# Patient Record
Sex: FEMALE | Race: WHITE | NOT HISPANIC OR LATINO | URBAN - METROPOLITAN AREA
[De-identification: names, ages, dates, MRNs, and addresses within clinical notes are randomized per-mention and may not be internally consistent; named-entity substitution may affect disease eponyms.]

---

## 2017-08-28 ENCOUNTER — EMERGENCY (EMERGENCY)
Facility: HOSPITAL | Age: 47
LOS: 1 days | Discharge: HOME | End: 2017-08-28
Attending: EMERGENCY MEDICINE | Admitting: EMERGENCY MEDICINE
Payer: COMMERCIAL

## 2017-08-28 VITALS
OXYGEN SATURATION: 96 % | HEART RATE: 97 BPM | SYSTOLIC BLOOD PRESSURE: 142 MMHG | TEMPERATURE: 99 F | DIASTOLIC BLOOD PRESSURE: 99 MMHG | RESPIRATION RATE: 20 BRPM

## 2017-08-28 VITALS
OXYGEN SATURATION: 100 % | RESPIRATION RATE: 16 BRPM | HEART RATE: 77 BPM | DIASTOLIC BLOOD PRESSURE: 72 MMHG | SYSTOLIC BLOOD PRESSURE: 128 MMHG

## 2017-08-28 PROCEDURE — 99284 EMERGENCY DEPT VISIT MOD MDM: CPT

## 2017-08-28 PROCEDURE — 73110 X-RAY EXAM OF WRIST: CPT | Mod: 26,RT

## 2017-08-28 RX ORDER — IBUPROFEN 200 MG
600 TABLET ORAL ONCE
Qty: 0 | Refills: 0 | Status: COMPLETED | OUTPATIENT
Start: 2017-08-28 | End: 2017-08-28

## 2017-08-28 RX ORDER — IBUPROFEN 200 MG
1 TABLET ORAL
Qty: 20 | Refills: 0
Start: 2017-08-28

## 2017-08-28 RX ADMIN — Medication 50 MILLIGRAM(S): at 12:53

## 2017-08-28 RX ADMIN — Medication 600 MILLIGRAM(S): at 12:53

## 2017-08-28 NOTE — ED ADULT NURSE NOTE - OBJECTIVE STATEMENT
Pt received to room 10A with c/o of right hand pain and swelling radial pulse is present pt denies any trauma skin is dry intact.

## 2017-08-28 NOTE — ED ADULT TRIAGE NOTE - CHIEF COMPLAINT QUOTE
c/o right wrist pain pt treated in June with cortisone given a brace to wear pt woke up Monday with intense pain to wrist pt told by pmd to come to ER for evaluation pt told possible de quervain syndrome

## 2017-08-28 NOTE — ED PROVIDER NOTE - PROGRESS NOTE DETAILS
Pt states feeling better. Notes she works as a  with a lot of typing. Advised to continue wrist splint. Sent Motrin, Prednisone, and Percocet to pharmacy.

## 2017-08-28 NOTE — ED PROVIDER NOTE - MUSCULOSKELETAL MINIMAL EXAM
FROM, mild swelling over thenar eminence. No bony tenderness. Pain elicited with closed fist and lateral motion of wrist

## 2018-06-19 NOTE — ED PROVIDER NOTE - OBJECTIVE STATEMENT
48 y/o female with PMHx of tendonitis presents to ED for right hand pain X 2 months. Pt states 2 years ago she was seen by Hand Surgeon for wrist pain and found to have tendonitis and given a cortisone shot which helped relieve the pain for the past 2 years. Pt states over the past week pain has gotten worse with some swelling. Pt called her Hand Surgeon who is out of town and not available till next week. Pt denies any falls or trauma to the area. Admits to pain with fine motor movements. Denies any fever, chills, SOB, chest pain, or abd pain. Denies any skin color changes to the area. Pt was told she had deQuervain syndrome.
normal/no guarding/no distention/nontender/no organomegaly/no bruit/soft/no masses palpable/bowel sounds normal/no rebound tenderness/no rigidity

## 2019-11-18 PROBLEM — M77.9 ENTHESOPATHY, UNSPECIFIED: Chronic | Status: ACTIVE | Noted: 2017-08-28

## 2019-11-26 ENCOUNTER — APPOINTMENT (OUTPATIENT)
Dept: ORTHOPEDIC SURGERY | Facility: CLINIC | Age: 49
End: 2019-11-26
Payer: COMMERCIAL

## 2019-11-26 PROCEDURE — 73630 X-RAY EXAM OF FOOT: CPT | Mod: LT

## 2019-11-26 PROCEDURE — 99214 OFFICE O/P EST MOD 30 MIN: CPT

## 2020-03-23 ENCOUNTER — APPOINTMENT (OUTPATIENT)
Dept: ENDOCRINOLOGY | Facility: CLINIC | Age: 50
End: 2020-03-23
Payer: COMMERCIAL

## 2020-03-23 VITALS
HEART RATE: 86 BPM | WEIGHT: 293 LBS | OXYGEN SATURATION: 98 % | DIASTOLIC BLOOD PRESSURE: 80 MMHG | BODY MASS INDEX: 41.95 KG/M2 | SYSTOLIC BLOOD PRESSURE: 122 MMHG | HEIGHT: 70 IN

## 2020-03-23 PROCEDURE — 99244 OFF/OP CNSLTJ NEW/EST MOD 40: CPT | Mod: 25

## 2020-03-23 PROCEDURE — 76536 US EXAM OF HEAD AND NECK: CPT

## 2020-03-23 NOTE — REVIEW OF SYSTEMS
[Fatigue] : no fatigue [Decreased Appetite] : appetite not decreased [Recent Weight Gain (___ Lbs)] : no recent weight gain [Recent Weight Loss (___ Lbs)] : recent [unfilled] ~Ulb weight loss [Visual Field Defect] : no visual field defect [Blurry Vision] : no blurred vision [Dry Eyes] : no dryness of the eyes [Eyes Itch] : no itching of the eyes [Dysphagia] : no dysphagia [Dysphonia] : no dysphonia [Neck Pain] : no neck pain [Nasal Congestion] : no nasal congestion [Chest Pain] : no chest pain [Palpitations] : no palpitations [Heart Rate Is Slow] : the heart rate was not slow [Heart Rate Is Fast] : the heart rate was not fast [Shortness Of Breath] : no shortness of breath [Wheezing] : no wheezing was heard [Cough] : no cough [SOB on Exertion] : no shortness of breath during exertion [Nausea] : no nausea [Vomiting] : no vomiting was observed [Constipation] : no constipation [Diarrhea] : no diarrhea [Polyuria] : no polyuria [Irregular Menses] : regular menses [Joint Pain] : no joint pain [Joint Stiffness] : no joint stiffness [Muscle Weakness] : no muscle weakness [Muscle Cramps] : no muscle cramps [Acanthosis] : no acanthosis  [Hirsutism] : no hirsutism [Acne] : no acne [Hair Loss] : no hair loss [Headache] : no headaches [Tremors] : no tremors [Dizziness] : no dizziness [Depression] : no depression [Anxiety] : no anxiety [Polydipsia] : no polydipsia [Galactorrhea] : no galactorrhea  [Cold Intolerance] : cold tolerant [Heat Intolerance] : heat tolerant [Easy Bleeding] : no ~M tendency for easy bleeding [Easy Bruising] : no tendency for easy bruising [Swelling] : no swelling

## 2020-03-23 NOTE — HISTORY OF PRESENT ILLNESS
[FreeTextEntry1] : 49 year old female with history of obesity, thyroid nodule here for evaluation \par \par \par 2016- was the last ultrasound which showed a right sided 2.0 cm nodule \par 2002: Last FNA of the larger 2 cm nodule \par No family history of thyroid cancer \par No history of head or neck radiation exposure \par \par Never been on thyroid medications \par Difficulty swallowing, no hoarseness and no choking sensation \par No pressure in the neck \par \par \par She does not have recent TFTs \par She reported that she was on Genie Wilfrid and another one is HMR \par Previously had borderline elevated HgA1C

## 2020-03-23 NOTE — PROCEDURE
[DecisionView e 2008 model, 10-12 MHz frequencies] : multiple real time longitudinal and transverse images were obtained using a high resolution ultrasound with a linear transducer, DecisionView e 2008 model, 10-12 MHz frequencies. All measurements will be reported as longitudinal x alexx-posterior x transverse. [Thyroid Nodule] : thyroid nodule [] : a homogeneous parenchyma [Right Thyroid] : right [Lower] : lower pole there is a  [Solid] : solid [Heterogeneous] : heterogenous nodule [Ovoid] : ovoid in shape [Regular] : regular [No] : does not have a halo [No calcification] : no calcification [Left Thyroid] : left [Upper] : upper pole there is a  [Mixed] : mixed [Round] : round in shape [Smooth] : smooth [No] : does not have a halo [No calcifications] : no calcifications [Peripheral vascularity] : peripheral vascularity [2] : 2 [No abnormal lymph nodes are seen.] : no abnormal lymph nodes are seen [FreeTextEntry1] : 5.63 x 1.75 x 2.05 [FreeTextEntry5] : 4.1 x 1.96 x 1.75 [FreeTextEntry2] : 0.48 [FreeTextEntry3] : 0.53 x 0.5 x 0.47

## 2020-03-23 NOTE — IMPRESSION
[FreeTextEntry1] : Right lower pole nodule ( Solid, heterogenous), displaying interval stability  [FreeTextEntry2] : Follow up in one year with thyroid ultrasound

## 2020-03-23 NOTE — PHYSICAL EXAM
[Alert] : alert [No Acute Distress] : no acute distress [Well Nourished] : well nourished [Well Developed] : well developed [Normal Sclera/Conjunctiva] : normal sclera/conjunctiva [PERRL] : pupils equal, round and reactive to light [EOMI] : extra ocular movement intact [Normal Outer Ear/Nose] : the ears and nose were normal in appearance [Normal TMs] : both tympanic membranes were normal [Normal Hearing] : hearing was normal [No Neck Mass] : no neck mass was observed [Supple] : the neck was supple [Thyroid Not Enlarged] : the thyroid was not enlarged [No Respiratory Distress] : no respiratory distress [Normal Rate and Effort] : normal respiratory rhythm and effort [Clear to Auscultation] : lungs were clear to auscultation bilaterally [Normal Rate] : heart rate was normal  [Normal S1, S2] : normal S1 and S2 [Regular Rhythm] : with a regular rhythm [Normal Bowel Sounds] : normal bowel sounds [Not Tender] : non-tender [Soft] : abdomen soft [Not Distended] : not distended [No CVA Tenderness] : no ~M costovertebral angle tenderness [Kyphosis] : no kyphosis present [Scoliosis] : scoliosis not present [Normal Gait] : normal gait [No Joint Swelling] : no joint swelling seen [No Clubbing, Cyanosis] : no clubbing  or cyanosis of the fingernails [Normal Strength/Tone] : muscle strength and tone were normal [No Rash] : no rash [No Skin Lesions] : no skin lesions [Normal Reflexes] : deep tendon reflexes were 2+ and symmetric [No Motor Deficits] : the motor exam was normal [No Tremors] : no tremors [Oriented x3] : oriented to person, place, and time [Normal Insight/Judgement] : insight and judgment were intact [Normal Affect] : the affect was normal [Normal Mood] : the mood was normal

## 2020-03-23 NOTE — ASSESSMENT
[FreeTextEntry1] : 49 year old female here for evaluation of thyroid nodule, obesity and pre-diabetes here for evaluation\par \par Thyroid nodule\par ------------------\par -Previous FNA in 2002\par -Nodule displaying interval stability since 2016 \par -Will continue to observe once a year \par \par Pre- DM\par ---------\par -Will continue with healthy diet which is low carb consistent diet \par -Will incorporate exercise as well\par -Check HgA1C, lipid panel, microalb/cr ratio \par \par Obesity \par -Extensive discussion about bariatric surgery, she will be thinking about it \par \par Follow up in 6 months

## 2020-03-23 NOTE — PROCEDURE
[[employer] e 2008 model, 10-12 MHz frequencies] : multiple real time longitudinal and transverse images were obtained using a high resolution ultrasound with a linear transducer, [employer] e 2008 model, 10-12 MHz frequencies. All measurements will be reported as longitudinal x alexx-posterior x transverse. [Thyroid Nodule] : thyroid nodule [] : a homogeneous parenchyma [Right Thyroid] : right [Lower] : lower pole there is a  [Solid] : solid [Heterogeneous] : heterogenous nodule [Ovoid] : ovoid in shape [Regular] : regular [No] : does not have a halo [No calcification] : no calcification [Left Thyroid] : left [Upper] : upper pole there is a  [Mixed] : mixed [Round] : round in shape [Smooth] : smooth [No] : does not have a halo [No calcifications] : no calcifications [Peripheral vascularity] : peripheral vascularity [2] : 2 [No abnormal lymph nodes are seen.] : no abnormal lymph nodes are seen [FreeTextEntry1] : 5.63 x 1.75 x 2.05 [FreeTextEntry5] : 4.1 x 1.96 x 1.75 [FreeTextEntry2] : 0.48 [FreeTextEntry3] : 0.53 x 0.5 x 0.47

## 2020-03-25 LAB
25(OH)D3 SERPL-MCNC: 18.1 NG/ML
ALBUMIN SERPL ELPH-MCNC: 4.5 G/DL
ALP BLD-CCNC: 90 U/L
ALT SERPL-CCNC: 22 U/L
ANION GAP SERPL CALC-SCNC: 27 MMOL/L
AST SERPL-CCNC: 20 U/L
BASOPHILS # BLD AUTO: 0.09 K/UL
BASOPHILS NFR BLD AUTO: 0.8 %
BILIRUB SERPL-MCNC: 0.2 MG/DL
BUN SERPL-MCNC: 9 MG/DL
CALCIUM SERPL-MCNC: 9.4 MG/DL
CHLORIDE SERPL-SCNC: 101 MMOL/L
CHOLEST SERPL-MCNC: 173 MG/DL
CHOLEST/HDLC SERPL: 4.5 RATIO
CO2 SERPL-SCNC: 18 MMOL/L
CREAT SERPL-MCNC: 0.73 MG/DL
CREAT SPEC-SCNC: 77 MG/DL
EOSINOPHIL # BLD AUTO: 0.12 K/UL
EOSINOPHIL NFR BLD AUTO: 1.1 %
ESTIMATED AVERAGE GLUCOSE: 146 MG/DL
GLUCOSE SERPL-MCNC: 114 MG/DL
HBA1C MFR BLD HPLC: 6.7 %
HCT VFR BLD CALC: 49.1 %
HDLC SERPL-MCNC: 38 MG/DL
HGB BLD-MCNC: 15.6 G/DL
IMM GRANULOCYTES NFR BLD AUTO: 0.4 %
LDLC SERPL CALC-MCNC: 95 MG/DL
LYMPHOCYTES # BLD AUTO: 2.66 K/UL
LYMPHOCYTES NFR BLD AUTO: 23.9 %
MAN DIFF?: NORMAL
MCHC RBC-ENTMCNC: 28.4 PG
MCHC RBC-ENTMCNC: 31.8 GM/DL
MCV RBC AUTO: 89.4 FL
MICROALBUMIN 24H UR DL<=1MG/L-MCNC: 3.4 MG/DL
MICROALBUMIN/CREAT 24H UR-RTO: 44 MG/G
MONOCYTES # BLD AUTO: 0.7 K/UL
MONOCYTES NFR BLD AUTO: 6.3 %
NEUTROPHILS # BLD AUTO: 7.54 K/UL
NEUTROPHILS NFR BLD AUTO: 67.5 %
PLATELET # BLD AUTO: 345 K/UL
POTASSIUM SERPL-SCNC: 4.6 MMOL/L
PROT SERPL-MCNC: 7.8 G/DL
RBC # BLD: 5.49 M/UL
RBC # FLD: 14.1 %
SODIUM SERPL-SCNC: 145 MMOL/L
T4 FREE SERPL-MCNC: 1.1 NG/DL
TRIGL SERPL-MCNC: 199 MG/DL
TSH SERPL-ACNC: 4.77 UIU/ML
WBC # FLD AUTO: 11.15 K/UL

## 2020-04-20 ENCOUNTER — TRANSCRIPTION ENCOUNTER (OUTPATIENT)
Age: 50
End: 2020-04-20

## 2020-05-04 ENCOUNTER — APPOINTMENT (OUTPATIENT)
Dept: INTERNAL MEDICINE | Facility: CLINIC | Age: 50
End: 2020-05-04

## 2020-07-21 ENCOUNTER — APPOINTMENT (OUTPATIENT)
Dept: INTERNAL MEDICINE | Facility: CLINIC | Age: 50
End: 2020-07-21

## 2020-07-23 ENCOUNTER — APPOINTMENT (OUTPATIENT)
Dept: INTERNAL MEDICINE | Facility: CLINIC | Age: 50
End: 2020-07-23
Payer: COMMERCIAL

## 2020-07-23 ENCOUNTER — LABORATORY RESULT (OUTPATIENT)
Age: 50
End: 2020-07-23

## 2020-07-23 VITALS
DIASTOLIC BLOOD PRESSURE: 66 MMHG | WEIGHT: 293 LBS | HEART RATE: 78 BPM | OXYGEN SATURATION: 97 % | TEMPERATURE: 97.3 F | BODY MASS INDEX: 41.95 KG/M2 | SYSTOLIC BLOOD PRESSURE: 106 MMHG | HEIGHT: 70 IN

## 2020-07-23 DIAGNOSIS — Z00.00 ENCOUNTER FOR GENERAL ADULT MEDICAL EXAMINATION W/OUT ABNORMAL FINDINGS: ICD-10-CM

## 2020-07-23 DIAGNOSIS — M67.88 OTHER SPECIFIED DISORDERS OF SYNOVIUM AND TENDON, OTHER SITE: ICD-10-CM

## 2020-07-23 DIAGNOSIS — M77.9 ENTHESOPATHY, UNSPECIFIED: ICD-10-CM

## 2020-07-23 DIAGNOSIS — M21.6X9 OTHER ACQUIRED DEFORMITIES OF UNSPECIFIED FOOT: ICD-10-CM

## 2020-07-23 DIAGNOSIS — G62.9 POLYNEUROPATHY, UNSPECIFIED: ICD-10-CM

## 2020-07-23 DIAGNOSIS — M62.89 OTHER SPECIFIED DISORDERS OF MUSCLE: ICD-10-CM

## 2020-07-23 DIAGNOSIS — M92.61 JUVENILE OSTEOCHONDROSIS OF TARSUS, RIGHT ANKLE: ICD-10-CM

## 2020-07-23 DIAGNOSIS — Z87.898 PERSONAL HISTORY OF OTHER SPECIFIED CONDITIONS: ICD-10-CM

## 2020-07-23 DIAGNOSIS — R07.89 OTHER CHEST PAIN: ICD-10-CM

## 2020-07-23 LAB — HEMOCCULT STL QL IA: NEGATIVE

## 2020-07-23 PROCEDURE — G0447 BEHAVIOR COUNSEL OBESITY 15M: CPT

## 2020-07-23 PROCEDURE — 99215 OFFICE O/P EST HI 40 MIN: CPT | Mod: 25

## 2020-07-23 PROCEDURE — G0442 ANNUAL ALCOHOL SCREEN 15 MIN: CPT

## 2020-07-23 PROCEDURE — 99386 PREV VISIT NEW AGE 40-64: CPT

## 2020-07-23 NOTE — HISTORY OF PRESENT ILLNESS
[FreeTextEntry1] : Presents to re-establish care for preventive visit as well as concerns regarding anemia, morbid obesity, neuropathy, prediabetes, solitary thyroid nodule and vitamin D deficiency. [de-identified] : \par Preventive visit: pt is up to date with vaccines; she is due for colonoscopy screening and is due for mammogram screening; she does not smoke cigarettes and does not misuse alcohol; she feels safe at home and has smoke/CO detectors in the house; she wears seatbelts when in vehicles; she sees her GYN for PAP/pelvic exams\par \par Medical Issue 1: Anemia: unstable and variably controlled with fluctuations in Hb levels; this was due to blood loss from menorrhagia which has since improved due to removal of fibroids by her GYN; she denies pelvic pain, melena, BRBPR and coffee ground emesis\par \par Medical Issue 2: Morbid obesity: improving but BMI still above 50; she is working on her diet but not incorporating much exercise in her routine yet; she is asking for help with resources to achieve her weight loss goals\par \par Medical Issue 3: Neuropathy: unstable and worsening with increase in pain in her extremities; she has not followed up with neurology in the recent past but would like to do so now; she denies weakness in her extremities and denies paresthesias as well\par \par Medical Issue 4: Prediabetes: unstable with fluctuations in serum glucose levels; last fasting level 124 was closer to border with diabetes; this is complicated by concomitant neuropathy as well\par \par Medical Issue 5: Solitary thyroid nodule: stable, she denies change in size; she follows with Endocrine as well who has been monitoring it; had U/S recently which showed stability as per patient\par \par Medical Issue 6: Vitamin D deficiency: unstable and poorly controlled; she does not get much sun exposure and she is not consistent with her supplements; she denies bone pain and fractures

## 2020-07-23 NOTE — ASSESSMENT
[FreeTextEntry1] : \par Preventive visit: pt is up to date with vaccines; she is due for colonoscopy screening and is due for mammogram screening, referrals given today; she does not smoke cigarettes and does not misuse alcohol; she feels safe at home and has smoke/CO detectors in the house; she wears seatbelts when in vehicles; she sees her GYN for PAP/pelvic exams\[facility] \[facility] Medical Issue 1: Anemia: unstable and variably controlled with fluctuations in Hb levels; this was due to blood loss from menorrhagia which has since improved due to removal of fibroids by her GYN; she denies pelvic pain, melena, BRBPR and coffee ground emesis; check CBC, iron studies and B12 levels via blood draw today; coordinated follow-up care with GYN\[facility] \[facility] Medical Issue 2: Morbid obesity counselling: 15 mins, assessed BMI at 50 and above now in obese range; advised weight loss, exercise routine and diet; pt agreed to start exercise program for target weight loss 20 lbs; assisted patient with resources including nutrition referral as needed and arranged for follow-up to monitor weight loss progress over next several months\[facility] \[facility] Medical Issue 3: Neuropathy: unstable and worsening with increase in pain in her extremities; she has not followed up with neurology in the recent past but would like to do so now; she denies weakness in her extremities and denies paresthesias as well; coordinated follow-up care with neurology for EMG testing\[facility] \[facility] Medical Issue 4: Prediabetes: unstable with fluctuations in serum glucose levels; last fasting level 124 was closer to border with diabetes; this is complicated by concomitant neuropathy as well; check A1c and serum glucose via blood draw today; counselled pt on importance of eating low-carb diet\par \par Medical Issue 5: Solitary thyroid nodule: stable, she denies change in size; she follows with Endocrine as well who has been monitoring it; had U/S recently which showed stability as per patient; check TFTs via blood draw today and coordinated follow-up care with Endocrine\[facility] \[facility] Medical Issue 6: Vitamin D deficiency: unstable and poorly controlled; she does not get much sun exposure and she is not consistent with her supplements; she denies bone pain and fractures; check Vit D level via blood draw today\par \par Annual alcohol misuse screen, 15 mins, done; negative

## 2020-07-23 NOTE — HEALTH RISK ASSESSMENT
[Good] : ~his/her~  mood as  good [] : No [No] : No [1 or 2 (0 pts)] : 1 or 2 (0 points) [Never (0 pts)] : Never (0 points) [No falls in past year] : Patient reported no falls in the past year [Audit-CScore] : 0 [Change in mental status noted] : No change in mental status noted [Language] : denies difficulty with language [Behavior] : denies difficulty with behavior [Learning/Retaining New Information] : denies difficulty learning/retaining new information [Handling Complex Tasks] : denies difficulty handling complex tasks [Reasoning] : denies difficulty with reasoning [Spatial Ability and Orientation] : denies difficulty with spatial ability and orientation [None] : None [With Family] : lives with family [High Risk Behavior] : no high risk behavior [Feels Safe at Home] : Feels safe at home [Fully functional (bathing, dressing, toileting, transferring, walking, feeding)] : Fully functional (bathing, dressing, toileting, transferring, walking, feeding) [Fully functional (using the telephone, shopping, preparing meals, housekeeping, doing laundry, using] : Fully functional and needs no help or supervision to perform IADLs (using the telephone, shopping, preparing meals, housekeeping, doing laundry, using transportation, managing medications and managing finances) [Reports changes in hearing] : Reports no changes in hearing [Reports changes in vision] : Reports no changes in vision [Reports normal functional visual acuity (ie: able to read med bottle)] : Reports normal functional visual acuity [Reports changes in dental health] : Reports no changes in dental health [Smoke Detector] : smoke detector [Carbon Monoxide Detector] : carbon monoxide detector [Guns at Home] : no guns at home [Safety elements used in home] : safety elements used in home [Seat Belt] :  uses seat belt [Sunscreen] : uses sunscreen [Travel to Developing Areas] : does not  travel to developing areas [TB Exposure] : is not being exposed to tuberculosis [Caregiver Concerns] : does not have caregiver concerns [Reviewed no changes] : Reviewed no changes [AdvancecareDate] : 07/20

## 2020-07-24 LAB
25(OH)D3 SERPL-MCNC: 52.7 NG/ML
ALBUMIN SERPL ELPH-MCNC: 4.5 G/DL
ALP BLD-CCNC: 69 U/L
ALT SERPL-CCNC: 25 U/L
ANION GAP SERPL CALC-SCNC: 15 MMOL/L
AST SERPL-CCNC: 22 U/L
BASOPHILS # BLD AUTO: 0.11 K/UL
BASOPHILS NFR BLD AUTO: 1.3 %
BILIRUB SERPL-MCNC: 0.4 MG/DL
BUN SERPL-MCNC: 20 MG/DL
CALCIUM SERPL-MCNC: 9.4 MG/DL
CHLORIDE SERPL-SCNC: 102 MMOL/L
CHOLEST SERPL-MCNC: 152 MG/DL
CHOLEST/HDLC SERPL: 4 RATIO
CO2 SERPL-SCNC: 27 MMOL/L
CREAT SERPL-MCNC: 0.71 MG/DL
EOSINOPHIL # BLD AUTO: 0.14 K/UL
EOSINOPHIL NFR BLD AUTO: 1.6 %
ESTIMATED AVERAGE GLUCOSE: 123 MG/DL
FERRITIN SERPL-MCNC: 176 NG/ML
FOLATE SERPL-MCNC: 16.7 NG/ML
GLUCOSE SERPL-MCNC: 80 MG/DL
HBA1C MFR BLD HPLC: 5.9 %
HCT VFR BLD CALC: 47.6 %
HDLC SERPL-MCNC: 38 MG/DL
HGB BLD-MCNC: 14.7 G/DL
HIV1+2 AB SPEC QL IA.RAPID: NONREACTIVE
IMM GRANULOCYTES NFR BLD AUTO: 0.2 %
IRON SATN MFR SERPL: 27 %
IRON SERPL-MCNC: 78 UG/DL
LDLC SERPL CALC-MCNC: 95 MG/DL
LYMPHOCYTES # BLD AUTO: 2.53 K/UL
LYMPHOCYTES NFR BLD AUTO: 29.6 %
MAN DIFF?: NORMAL
MCHC RBC-ENTMCNC: 29.4 PG
MCHC RBC-ENTMCNC: 30.9 GM/DL
MCV RBC AUTO: 95.2 FL
MONOCYTES # BLD AUTO: 0.46 K/UL
MONOCYTES NFR BLD AUTO: 5.4 %
NEUTROPHILS # BLD AUTO: 5.28 K/UL
NEUTROPHILS NFR BLD AUTO: 61.9 %
PLATELET # BLD AUTO: 273 K/UL
POTASSIUM SERPL-SCNC: 4.9 MMOL/L
PROT SERPL-MCNC: 7.3 G/DL
RBC # BLD: 5 M/UL
RBC # FLD: 14.4 %
SARS-COV-2 IGG SERPL IA-ACNC: 0.08 INDEX
SARS-COV-2 IGG SERPL QL IA: NEGATIVE
SODIUM SERPL-SCNC: 144 MMOL/L
TIBC SERPL-MCNC: 286 UG/DL
TRIGL SERPL-MCNC: 95 MG/DL
TSH SERPL-ACNC: 4.4 UIU/ML
UIBC SERPL-MCNC: 208 UG/DL
VIT B12 SERPL-MCNC: 602 PG/ML
WBC # FLD AUTO: 8.54 K/UL

## 2020-07-30 LAB
APPEARANCE: CLEAR
BACTERIA: ABNORMAL
BILIRUBIN URINE: NEGATIVE
BLOOD URINE: ABNORMAL
COLOR: NORMAL
GLUCOSE QUALITATIVE U: NEGATIVE
HYALINE CASTS: 2 /LPF
KETONES URINE: NEGATIVE
LEUKOCYTE ESTERASE URINE: NEGATIVE
MICROSCOPIC-UA: NORMAL
NITRITE URINE: NEGATIVE
PH URINE: 6.5
PROTEIN URINE: NEGATIVE
RED BLOOD CELLS URINE: 5 /HPF
SPECIFIC GRAVITY URINE: 1.02
SQUAMOUS EPITHELIAL CELLS: 4 /HPF
UROBILINOGEN URINE: NORMAL
WHITE BLOOD CELLS URINE: 4 /HPF

## 2020-09-11 ENCOUNTER — APPOINTMENT (OUTPATIENT)
Dept: GASTROENTEROLOGY | Facility: CLINIC | Age: 50
End: 2020-09-11
Payer: COMMERCIAL

## 2020-09-11 VITALS
TEMPERATURE: 98.1 F | OXYGEN SATURATION: 96 % | SYSTOLIC BLOOD PRESSURE: 103 MMHG | DIASTOLIC BLOOD PRESSURE: 70 MMHG | HEIGHT: 70 IN | HEART RATE: 66 BPM

## 2020-09-11 DIAGNOSIS — K64.9 UNSPECIFIED HEMORRHOIDS: ICD-10-CM

## 2020-09-11 PROCEDURE — 99244 OFF/OP CNSLTJ NEW/EST MOD 40: CPT

## 2020-09-11 RX ORDER — FLUTICASONE PROPIONATE 50 MCG
SPRAY, SUSPENSION NASAL
Refills: 0 | Status: ACTIVE | COMMUNITY

## 2020-09-11 NOTE — PHYSICAL EXAM
[General Appearance - Alert] : alert [General Appearance - In No Acute Distress] : in no acute distress [Neck Appearance] : the appearance of the neck was normal [Neck Cervical Mass (___cm)] : no neck mass was observed [Jugular Venous Distention Increased] : there was no jugular-venous distention [Thyroid Diffuse Enlargement] : the thyroid was not enlarged [Thyroid Nodule] : there were no palpable thyroid nodules [] : no respiratory distress [Auscultation Breath Sounds / Voice Sounds] : lungs were clear to auscultation bilaterally [Heart Sounds] : normal S1 and S2 [Heart Rate And Rhythm] : heart rate was normal and rhythm regular [Obese] : obese [Normal] : normal [Soft, Nontender] : the abdomen was soft and nontender [Cervical Lymph Nodes Enlarged Posterior Bilaterally] : posterior cervical [Supraclavicular Lymph Nodes Enlarged Bilaterally] : supraclavicular [Cervical Lymph Nodes Enlarged Anterior Bilaterally] : anterior cervical [Axillary Lymph Nodes Enlarged Bilaterally] : axillary [No Spinal Tenderness] : no spinal tenderness [No CVA Tenderness] : no ~M costovertebral angle tenderness [Abnormal Walk] : normal gait [Nail Clubbing] : no clubbing  or cyanosis of the fingernails [Motor Tone] : muscle strength and tone were normal [Musculoskeletal - Swelling] : no joint swelling seen [Sensation] : the sensory exam was normal to light touch and pinprick [Deep Tendon Reflexes (DTR)] : deep tendon reflexes were 2+ and symmetric [Oriented To Time, Place, And Person] : oriented to person, place, and time [Impaired Insight] : insight and judgment were intact [No Focal Deficits] : no focal deficits [Affect] : the affect was normal [Dilated Collat. Veins] : no collateral vein dilation [Firm] : not firm [Rigid] : not rigid [Guarding] : no guarding [Rebound] : no rebound [Aragon's] : a negative Aragon's sign

## 2020-09-11 NOTE — REVIEW OF SYSTEMS
[As Noted in HPI] : as noted in HPI [Negative] : Heme/Lymph [Abdominal Pain] : no abdominal pain [Constipation] : no constipation [Diarrhea] : no diarrhea [Heartburn] : no heartburn [Melena] : no melena

## 2020-09-11 NOTE — HISTORY OF PRESENT ILLNESS
[Nausea] : denies nausea [Vomiting] : denies vomiting [Heartburn] : denies heartburn [Diarrhea] : denies diarrhea [Constipation] : denies constipation [Yellow Skin Or Eyes (Jaundice)] : denies jaundice [Abdominal Swelling] : denies abdominal swelling [Abdominal Pain] : denies abdominal pain [Rectal Pain] : denies rectal pain [GERD] : no gastroesophageal reflux disease [Hiatus Hernia] : no hiatus hernia [Peptic Ulcer Disease] : no peptic ulcer disease [Pancreatitis] : no pancreatitis [Cholelithiasis] : no cholelithiasis [Kidney Stone] : no kidney stone [Inflammatory Bowel Disease] : no inflammatory bowel disease [Irritable Bowel Syndrome] : no irritable bowel syndrome [Diverticulitis] : no diverticulitis [Alcohol Abuse] : no alcohol abuse [Malignancy] : no malignancy [Appendectomy] : no appendectomy [Abdominal Surgery] : no abdominal surgery [de-identified] : 50 year old female requesting colon cancer screening needs colonoscopy. Complains of intermittent bleeding from rectal hemorrhoids. Denies any blood in stool, melena or hematemesis. Blood only noticeable upon completion of defecation and wiping with toilet tissue. Patient also expressed extreme desire to lose weight. Requests information on how to lose wieght with proper eating.  [Cholecystectomy] : no cholecystectomy

## 2020-09-22 ENCOUNTER — APPOINTMENT (OUTPATIENT)
Dept: PULMONOLOGY | Facility: CLINIC | Age: 50
End: 2020-09-22
Payer: COMMERCIAL

## 2020-09-22 ENCOUNTER — LABORATORY RESULT (OUTPATIENT)
Age: 50
End: 2020-09-22

## 2020-09-22 VITALS
OXYGEN SATURATION: 96 % | SYSTOLIC BLOOD PRESSURE: 120 MMHG | TEMPERATURE: 98.4 F | DIASTOLIC BLOOD PRESSURE: 80 MMHG | RESPIRATION RATE: 16 BRPM | HEART RATE: 81 BPM

## 2020-09-22 DIAGNOSIS — T78.40XA ALLERGY, UNSPECIFIED, INITIAL ENCOUNTER: ICD-10-CM

## 2020-09-22 PROCEDURE — 99203 OFFICE O/P NEW LOW 30 MIN: CPT

## 2020-09-23 ENCOUNTER — APPOINTMENT (OUTPATIENT)
Dept: ENDOCRINOLOGY | Facility: CLINIC | Age: 50
End: 2020-09-23

## 2020-10-01 ENCOUNTER — APPOINTMENT (OUTPATIENT)
Dept: PULMONOLOGY | Facility: CLINIC | Age: 50
End: 2020-10-01
Payer: COMMERCIAL

## 2020-10-01 PROCEDURE — 94060 EVALUATION OF WHEEZING: CPT

## 2020-10-01 PROCEDURE — 94729 DIFFUSING CAPACITY: CPT

## 2020-10-01 PROCEDURE — 94727 GAS DIL/WSHOT DETER LNG VOL: CPT

## 2020-10-05 LAB
A ALTERNATA IGE QN: <0.1 KUA/L
A FUMIGATUS IGE QN: <0.1 KUA/L
BASOPHILS # BLD AUTO: 0.08 K/UL
BASOPHILS NFR BLD AUTO: 0.9 %
BERMUDA GRASS IGE QN: <0.1 KUA/L
BOXELDER IGE QN: <0.1 KUA/L
C HERBARUM IGE QN: <0.1 KUA/L
CALIF WALNUT IGE QN: <0.1 KUA/L
CAT DANDER IGE QN: <0.1 KUA/L
CMN PIGWEED IGE QN: <0.1 KUA/L
COMMON RAGWEED IGE QN: <0.1 KUA/L
COTTONWOOD IGE QN: <0.1 KUA/L
D FARINAE IGE QN: <0.1 KUA/L
D PTERONYSS IGE QN: <0.1 KUA/L
DEPRECATED A ALTERNATA IGE RAST QL: 0
DEPRECATED A FUMIGATUS IGE RAST QL: 0
DEPRECATED BERMUDA GRASS IGE RAST QL: 0
DEPRECATED BOXELDER IGE RAST QL: 0
DEPRECATED C HERBARUM IGE RAST QL: 0
DEPRECATED CAT DANDER IGE RAST QL: 0
DEPRECATED COMMON PIGWEED IGE RAST QL: 0
DEPRECATED COMMON RAGWEED IGE RAST QL: 0
DEPRECATED COTTONWOOD IGE RAST QL: 0
DEPRECATED D FARINAE IGE RAST QL: 0
DEPRECATED D PTERONYSS IGE RAST QL: 0
DEPRECATED DOG DANDER IGE RAST QL: 0
DEPRECATED GOOSEFOOT IGE RAST QL: 0
DEPRECATED LONDON PLANE IGE RAST QL: 0
DEPRECATED MUGWORT IGE RAST QL: 0
DEPRECATED P NOTATUM IGE RAST QL: 0
DEPRECATED RED CEDAR IGE RAST QL: 0
DEPRECATED ROACH IGE RAST QL: 0
DEPRECATED SHEEP SORREL IGE RAST QL: 0
DEPRECATED SILVER BIRCH IGE RAST QL: 0
DEPRECATED TIMOTHY IGE RAST QL: 0
DEPRECATED WHITE ASH IGE RAST QL: 0
DEPRECATED WHITE OAK IGE RAST QL: 0
DOG DANDER IGE QN: <0.1 KUA/L
EOSINOPHIL # BLD AUTO: 0.17 K/UL
EOSINOPHIL NFR BLD AUTO: 1.9 %
GOOSEFOOT IGE QN: <0.1 KUA/L
HCT VFR BLD CALC: 47.3 %
HGB BLD-MCNC: 14.7 G/DL
IMM GRANULOCYTES NFR BLD AUTO: 0.3 %
LONDON PLANE IGE QN: <0.1 KUA/L
LYMPHOCYTES # BLD AUTO: 2.72 K/UL
LYMPHOCYTES NFR BLD AUTO: 30.5 %
MAN DIFF?: NORMAL
MCHC RBC-ENTMCNC: 29.2 PG
MCHC RBC-ENTMCNC: 31.1 GM/DL
MCV RBC AUTO: 93.8 FL
MONOCYTES # BLD AUTO: 0.48 K/UL
MONOCYTES NFR BLD AUTO: 5.4 %
MUGWORT IGE QN: <0.1 KUA/L
MULBERRY (T70) CLASS: 0
MULBERRY (T70) CONC: <0.1 KUA/L
NEUTROPHILS # BLD AUTO: 5.43 K/UL
NEUTROPHILS NFR BLD AUTO: 61 %
P NOTATUM IGE QN: <0.1 KUA/L
PLATELET # BLD AUTO: 339 K/UL
RBC # BLD: 5.04 M/UL
RBC # FLD: 14 %
RED CEDAR IGE QN: <0.1 KUA/L
ROACH IGE QN: <0.1 KUA/L
SARS-COV-2 N GENE NPH QL NAA+PROBE: NOT DETECTED
SHEEP SORREL IGE QN: <0.1 KUA/L
SILVER BIRCH IGE QN: <0.1 KUA/L
TIMOTHY IGE QN: <0.1 KUA/L
TOTAL IGE SMQN RAST: 28 KU/L
TREE ALLERG MIX1 IGE QL: 0
WBC # FLD AUTO: 8.91 K/UL
WHITE ASH IGE QN: <0.1 KUA/L
WHITE ELM IGE QN: 0
WHITE ELM IGE QN: <0.1 KUA/L
WHITE OAK IGE QN: <0.1 KUA/L

## 2020-10-07 DIAGNOSIS — R05 COUGH: ICD-10-CM

## 2020-10-07 DIAGNOSIS — G47.9 SLEEP DISORDER, UNSPECIFIED: ICD-10-CM

## 2020-10-07 NOTE — REVIEW OF SYSTEMS
[Nasal Congestion] : nasal congestion [Cough] : cough [Hay Fever] : hay fever [Fever] : no fever [Dry Eyes] : no dry eyes [Dyspnea] : no dyspnea [TextBox_144] : prediabetes, see HPI

## 2020-10-07 NOTE — PHYSICAL EXAM
[No Acute Distress] : no acute distress [II] : Mallampati Class: II [No Neck Mass] : no neck mass [No JVD] : no jvd [Normal Rate/Rhythm] : normal rate/rhythm [Normal S1, S2] : normal s1, s2 [No Murmurs] : no murmurs [No Resp Distress] : no resp distress [Clear to Auscultation Bilaterally] : clear to auscultation bilaterally [Benign] : benign [No Masses] : no masses [No HSM] : no hsm [Normal Bowel Sounds] : normal bowel sounds [No Clubbing] : no clubbing [No Edema] : no edema [No Focal Deficits] : no focal deficits [Oriented x3] : oriented x3 [TextBox_11] : crowded [TextBox_44] : thick, kyphotic

## 2020-10-07 NOTE — DISCUSSION/SUMMARY
[FreeTextEntry1] : 50 year old woman presents for evaluation because she has had WTC exposure.  she has allergy symptoms and has been advised to have pulmonary evaluation\par \par She is at risk for sleep apnea\par \par PLAN\par blood allergy testing testing\par \par return for PFT after COVID testing\par \par will consider sleep study\par \par She will will consider PPSV\par \par Jose Pineda MD [facility] \par \par ADDENDUM\par \par PFT performed 10/1/2020\par \par PFT\par \par normal spirometry, lung volumes and diffusion\par \par \par  Jose Pineda MD [facility]

## 2020-10-07 NOTE — HISTORY OF PRESENT ILLNESS
[TextBox_4] : 50 year old woman who  presents for evaluation.  She states that she was exposed at [facility] site in 2002 when she worked in area. \par \par \par She states she subsequently developed wheezing, cough, watery eyes.  She also reports frequent outer ear infections.  She states she has not been diagnosed with asthma though she has uses inhaled bronchodilator in past,  rarely.  CXR 7/8/2020 was read as normal. She states she currently does not have dyspnea. \par \par She underwent allergy testing in 2002 and 2008 that demonstrated allergy to pollen, cats and dust as well as others but she does not have results.  She uses Flonase daily.  \par \par PSH:\par tonsillectomy\par hand surgery\par \par \par PMH:\par prediabetes\par prior sinus infection treated with antibiotics, almost annually\par ear infections, outer ear, follows with ENT\par skin cancer on back, basal cell, being treated\par \par MEDS:\par Flonase\par vit D\par \par MVI\par \par \par ROS\par \par lost weight with diet\par \par no history of asthma, pneumonia, CAD, HLD, GI issues\par \par SH:\par \par former smoker quit 2005\par \par started smoking a s teen\par \par smoked few cigarettes per day, 2 packs per week.\par \par works in office\par \par ETOH\par \par none\par \par has a cat\par gabapentin\par \par \par SLEEP\par No snoring, witnessed apnea, excessive daytime sleepiness, morning headache \par \par She receives influenza vaccine annually\par \par She has not had had pneumonia vaccine, declines

## 2020-10-10 ENCOUNTER — APPOINTMENT (OUTPATIENT)
Dept: DISASTER EMERGENCY | Facility: CLINIC | Age: 50
End: 2020-10-10

## 2020-12-05 ENCOUNTER — APPOINTMENT (OUTPATIENT)
Dept: DISASTER EMERGENCY | Facility: CLINIC | Age: 50
End: 2020-12-05

## 2020-12-06 LAB — SARS-COV-2 N GENE NPH QL NAA+PROBE: NOT DETECTED

## 2020-12-08 ENCOUNTER — OUTPATIENT (OUTPATIENT)
Dept: OUTPATIENT SERVICES | Facility: HOSPITAL | Age: 50
LOS: 1 days | Discharge: HOME | End: 2020-12-08

## 2020-12-08 ENCOUNTER — APPOINTMENT (OUTPATIENT)
Dept: GASTROENTEROLOGY | Facility: HOSPITAL | Age: 50
End: 2020-12-08
Payer: COMMERCIAL

## 2020-12-08 VITALS
SYSTOLIC BLOOD PRESSURE: 114 MMHG | OXYGEN SATURATION: 99 % | HEART RATE: 78 BPM | DIASTOLIC BLOOD PRESSURE: 62 MMHG | RESPIRATION RATE: 17 BRPM

## 2020-12-08 VITALS
HEIGHT: 70 IN | SYSTOLIC BLOOD PRESSURE: 117 MMHG | DIASTOLIC BLOOD PRESSURE: 76 MMHG | RESPIRATION RATE: 20 BRPM | TEMPERATURE: 98 F | WEIGHT: 293 LBS | HEART RATE: 86 BPM

## 2020-12-08 LAB — HCG UR QL: NEGATIVE — SIGNIFICANT CHANGE UP

## 2020-12-08 PROCEDURE — ZZZZZ: CPT

## 2020-12-08 PROCEDURE — 46930 DESTROY INTERNAL HEMORRHOIDS: CPT

## 2020-12-08 PROCEDURE — 45378 DIAGNOSTIC COLONOSCOPY: CPT | Mod: 33

## 2020-12-08 RX ORDER — SODIUM CHLORIDE 9 MG/ML
1000 INJECTION, SOLUTION INTRAVENOUS
Refills: 0 | Status: DISCONTINUED | OUTPATIENT
Start: 2020-12-08 | End: 2020-12-09

## 2020-12-08 RX ORDER — FENTANYL CITRATE 50 UG/ML
25 INJECTION INTRAVENOUS
Refills: 0 | Status: DISCONTINUED | OUTPATIENT
Start: 2020-12-08 | End: 2020-12-09

## 2020-12-08 NOTE — ASU DISCHARGE PLAN (ADULT/PEDIATRIC) - CALL YOUR DOCTOR IF YOU HAVE ANY OF THE FOLLOWING:
Nausea and vomiting that does not stop/Bleeding that does not stop/Increased irritability or sluggishness/Wound/Surgical Site with redness, or foul smelling discharge or pus

## 2020-12-10 DIAGNOSIS — E55.9 VITAMIN D DEFICIENCY, UNSPECIFIED: ICD-10-CM

## 2020-12-10 DIAGNOSIS — Z87.891 PERSONAL HISTORY OF NICOTINE DEPENDENCE: ICD-10-CM

## 2020-12-10 DIAGNOSIS — D64.9 ANEMIA, UNSPECIFIED: ICD-10-CM

## 2020-12-10 DIAGNOSIS — G62.9 POLYNEUROPATHY, UNSPECIFIED: ICD-10-CM

## 2020-12-10 DIAGNOSIS — K64.1 SECOND DEGREE HEMORRHOIDS: ICD-10-CM

## 2020-12-10 DIAGNOSIS — K62.5 HEMORRHAGE OF ANUS AND RECTUM: ICD-10-CM

## 2020-12-10 DIAGNOSIS — E66.01 MORBID (SEVERE) OBESITY DUE TO EXCESS CALORIES: ICD-10-CM

## 2020-12-10 DIAGNOSIS — R73.03 PREDIABETES: ICD-10-CM

## 2021-09-03 ENCOUNTER — APPOINTMENT (OUTPATIENT)
Dept: BARIATRICS/WEIGHT MGMT | Facility: CLINIC | Age: 51
End: 2021-09-03
Payer: COMMERCIAL

## 2021-09-03 DIAGNOSIS — Z01.818 ENCOUNTER FOR OTHER PREPROCEDURAL EXAMINATION: ICD-10-CM

## 2021-09-03 DIAGNOSIS — Z11.59 ENCOUNTER FOR SCREENING FOR OTHER VIRAL DISEASES: ICD-10-CM

## 2021-09-03 DIAGNOSIS — Z12.11 ENCOUNTER FOR SCREENING FOR MALIGNANT NEOPLASM OF COLON: ICD-10-CM

## 2021-09-03 DIAGNOSIS — M76.62 ACHILLES TENDINITIS, RIGHT LEG: ICD-10-CM

## 2021-09-03 DIAGNOSIS — M76.61 ACHILLES TENDINITIS, RIGHT LEG: ICD-10-CM

## 2021-09-03 PROCEDURE — 99205 OFFICE O/P NEW HI 60 MIN: CPT | Mod: 95

## 2021-09-03 RX ORDER — SODIUM SULFATE, POTASSIUM SULFATE, MAGNESIUM SULFATE 17.5; 3.13; 1.6 G/ML; G/ML; G/ML
17.5-3.13-1.6 SOLUTION, CONCENTRATE ORAL
Qty: 1 | Refills: 0 | Status: DISCONTINUED | COMMUNITY
Start: 2020-09-11 | End: 2021-09-03

## 2021-09-03 NOTE — HISTORY OF PRESENT ILLNESS
[Home] : at home, [unfilled] , at the time of the visit. [Medical Office: ([facility])___] : at the medical office located in  [FreeTextEntry1] : Ms. LAUREN FRANCO is a 51 year year old female who presents for evaluation and treatment of Class 3 obesity. \par \par Obesity related co-morbidities: prediabetes last A1c 5.9%, in perimenopause, h/o irregular periods starting at 37, hasn't had sleep study, tendinitis in both ankles, back of her foot and ankle has bone spur - has had PT\par \par Patient lives - with mother\par Employment status - \par \par Weight History:\par Lowest adult weight: unknown\par Highest adult weight: 455\par \par Has lost 80 pounds over the past year.\par \par Obesity began in college.  Dress size - 18-20.  In middle school 7th grade, very thin "twig" - 164 lbs but she felt very tall and overweight because everyone else was a smaller weight.  She is unsure of her weight at all.  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  Dress size 18-20.  Mid 20s - had a successful run - lost 100 lbs in 6 months.  Gained it back and was at 20-22.  She is not able to give a weight estimate.  In her 30s - high 200s.  She was on a diet before covid, 455# start.  And then got down to high 300s.  Joined [employer]. +emotional eating, and eating irregular hours.  In June, mom was hospitalized and she \par \par Past weight loss attempts include:  WW, liquid diet, Genie Yuen, KASSI, HMR. These have produced a maximum of 100 pounds of weight loss.  \par Anti-obesity medications in the past: no, "always been against it."  Now, she feels she needs something. \par \par Reasons for desiring weight loss:health\par Perceived obstacles to losing weight: voracious appetite, portion control, emotional eating, taking care of mother\par \par Sleep: 6-7 hours. Sometimes wakes up in the middle of night. \par \par Has 3 regular meals a day. \par \par + lactose intolerant\par HMR- 2 shakes a day.  1 in morning, and 1 in afternoon.  Meals - 1 for lunch and 1 for dinner.  \par +loves salad - lettuce, tomato, add handful of black beans, tuna/fish. \par \par Diet history:\par wakes up at:\par B: 10-11am - 1 HMR shake in the morning - 120 calories. protein - 12g, carbs 14g carbs.  Sugar - 11g.  \par L: 3pm salad with beans/tuna/salmon. no salad dressing. \par D: 6- 8/9 pm - "sometimes healthy or not healthy" - all over the place.  grilled chicken, baked potatoes w mustard and sweet potatoes w plain. Sometimes steak. Usually vegetable - steamed. \par \par snacks: no snacking\par eating after dinner: No\par overeating episodes: She used to.  +feeling nauseous.  Never feel full. \par \par Sodas/fast food/processed foods: +Romanian and chinese. At least 2 meals per week.  No fried foods, no fast food\par \par Water intake per day: 2-3 water bottles per day.\par seltzer - 1L per day.\par coffee 2-3 cups per day in the morning. Drinks it with half and half.  No sugar.  She is open to skim milk. She's open to oat milk. \par \par Physical activity:\par Patient enjoys: crossfit, weight training, walking, swimming\par Current physical activity: swimming since June at least 5 times a week - lap swimming an hour at least, 1 hr walking, 30 min of weight. Alternative thinking about - walking, slight jog intermittently at [employer].  \par \par Habits patient would like to change: portion control, emotional control\par Level of interest in losing weight: 5/5\par Community support: 2/3 fam 3/5 friends\par \par Factors that have helped in the past with losing weight and keeping it off:  incr exercise, eating less take out. \par \par

## 2021-09-03 NOTE — ASSESSMENT
[FreeTextEntry1] : 51 y.o female with Class 3 obesity, prediabetes, presents for weight management. \par \par - will check GLP-1 coverage, patient interested\par - will email handouts\par - she is starting to eat healthier Sept 7th\par - will look into meal delivery - sunbasket\par - she wants to continue HMR shakes in morning\par - declines sleep study at this time but will consider\par - continue swimming or walking, water intake 64 oz per day\par \par Extensive dietary counseling was provided:\par -discussed calorie reduction options: plant-based whole food diet vs. Dash / Mediterranean w/ calorie reduction\par -discussed the usefulness of calorie counting phone applications\par -provided patient with daily estimated calorie requirements and recommended calorie reduction amount\par -three meal components emphasized: large portion vegetables/fruit, smaller portion protein favoring plant-based, smaller portion high fiber carbohydrates\par -properties of macronutrients were reviewed to help the patient understand why a balanced diet is important\par -discussed the avoidance of red and processed meat, sugar sweetened beverages, refined carbohydrates, high fat dairy\par -advised avoidance of snack products and packaged / processed foods\par -counseled about meal timing and portion: large breakfast, medium lunch, small dinner\par -advised to avoid carbohydrates in evening if possible\par -regular water or seltzer throughout the day\par -for stimulus control, advised to keep unhealthy foods out of the house or out of view\par -recommended abstaining entirely from restaurant / fast food / take-out meals\par \par \par Lifestyle recommendations for weight loss were extensively reviewed\par -aerobic exercise reviewed: moderate intensity versus high intensity exercise - an estimate of daily / weekly time requirements was reviewed\par -emphasized that resistance training in addition to aerobic may provide added benefit\par -emphasized that long term weight loss and maintenance depend upon exercise\par -the need for adequate sleep (6+ hours) was reviewed\par \par f/u 2-3 weeks - TEB\par \par Bariatric surgery history: none\par Obesity co-morbidities: prediabetes\par Comorbidities improved or resolved:none\par Anti-obesity medications:none\par Obesity medication side effects:na\par

## 2021-09-03 NOTE — REVIEW OF SYSTEMS
[Patient Intake Form Reviewed] : Patient intake form was reviewed [Negative] : Allergic/Immunologic [MED-ROS-Cons-FT] : wt gain, fatigue

## 2021-09-24 ENCOUNTER — APPOINTMENT (OUTPATIENT)
Dept: BARIATRICS/WEIGHT MGMT | Facility: CLINIC | Age: 51
End: 2021-09-24
Payer: COMMERCIAL

## 2021-09-24 DIAGNOSIS — D64.9 ANEMIA, UNSPECIFIED: ICD-10-CM

## 2021-09-24 DIAGNOSIS — J45.909 UNSPECIFIED ASTHMA, UNCOMPLICATED: ICD-10-CM

## 2021-09-24 DIAGNOSIS — E55.9 VITAMIN D DEFICIENCY, UNSPECIFIED: ICD-10-CM

## 2021-09-24 DIAGNOSIS — Z13.39 ENCOUNTER FOR SCREENING EXAM FOR OTHER MENTAL HEALTH AND BEHAVIORAL DISORDERS: ICD-10-CM

## 2021-09-24 PROCEDURE — 99214 OFFICE O/P EST MOD 30 MIN: CPT | Mod: 95

## 2021-09-24 NOTE — ASSESSMENT
[FreeTextEntry1] : 51 y.o female with Class 3 obesity, prediabetes, presents for weight management. \par \par - will continue surgery recommendations at next visit\par - still doesn't want to get ARLET screen - continue to ask each visit\par - will check GLP-1 coverage, patient interested - > unsure coverage\par - will start phentermine\par - needs to come into office to get weighed\par - continue sunbasket\par - she wants to continue HMR shakes in morning\par - declines sleep study at this time but will consider\par - continue swimming or walking, water intake 64 oz per day\par \par f/u 2-3 weeks - IN PERSON\par \par Bariatric surgery history: none\par Obesity co-morbidities: prediabetes\par Comorbidities improved or resolved:none\par Anti-obesity medications: phentermine\par Obesity medication side effects:na\par

## 2021-09-24 NOTE — HISTORY OF PRESENT ILLNESS
[Home] : at home, [unfilled] , at the time of the visit. [Medical Office: ([facility])___] : at the medical office located in  [FreeTextEntry1] : Ms. LAUREN FRANCO is a 51 year year old female who presents for evaluation and treatment of Class 3 obesity. \par \par Obesity related co- morbidities: prediabetes last A1c 5.9%, in perimenopause, h/o irregular periods starting at 37, hasn't had sleep study, tendinitis in both ankles, back of her foot and ankle has bone spur - has had PT\par \par Patient lives - with mother\par Employment status - \par \par Interim: \par - unknown weight but clothes are looser, bowel habits are much improved \par - hungry "all day"\par - started sunbasket for dinner\par - doing shake in breakfast - [facility]\par - salad for lunch \par - dinner - sunbasket - has been working out for her. she enjoys the meals\par - she's been thinking about it and for the first time in her life - she's thinking maybe she needs to get bariatric surgery.  I agreed with her that surgery would benefit her most due to her BMI.  We do not have an accurate weight for her, and she's hesitant bc she thinks doing surgery would be a personal failure.  We will discuss this further next time\par - she'd like to start medications - GLP-1 coverage unknown.  Will start phentermine first.\par \par Weight History:\par Lowest adult weight: unknown\par Highest adult weight: 455\par \par Has lost 80 pounds over the past year.\par \par Obesity began in college.  Dress size - 18-20.  In middle school 7th grade, very thin "twig" - 164 lbs but she felt very tall and overweight because everyone else was a smaller weight.  She is unsure of her weight at all.  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  Dress size 18-20.  Mid 20s - had a successful run - lost 100 lbs in 6 months.  Gained it back and was at 20-22.  She is not able to give a weight estimate.  In her 30s - high 200s.  She was on a diet before covid, 455# start.  And then got down to high 300s.  Joined [facility]. +emotional eating, and eating irregular hours.  In June, mom was hospitalized and had to take care of her\par \par Past weight loss attempts include:  WW, liquid diet, Genie Yuen, RDN, HMR. These have produced a maximum of 100 pounds of weight loss.  \par Anti-obesity medications in the past: no, "always been against it."  Now, she feels she needs something. \par \par Reasons for desiring weight loss:health\par Perceived obstacles to losing weight: voracious appetite, portion control, emotional eating, taking care of mother\par \par Sleep: 6-7 hours. Sometimes wakes up in the middle of night. \par \par Has 3 regular meals a day. \par \par + lactose intolerant\par HMR- 2 shakes a day.  1 in morning, and 1 in afternoon.  Meals - 1 for lunch and 1 for dinner.  \par +loves salad - lettuce, tomato, add handful of black beans, tuna/fish. \par \par Diet history:\par wakes up at:\par B: 10-11am - 1 HMR vanilla shake in the morning - 120 calories. protein - 12g, carbs 14g carbs.  Sugar - 11g.  \par L: 3pm salad with beans/tuna/salmon. no salad dressing. \par D: 6- 8/9 pm - "sometimes healthy or not healthy" - all over the place.  grilled chicken, baked potatoes w mustard and sweet potatoes w plain. Sometimes steak. Usually vegetable - steamed. \par \par snacks: no snacking\par eating after dinner: No\par overeating episodes: She used to.  +feeling nauseous.  Never feel full. \par \par Sodas/fast food/processed foods: +[place]ypriot and chinese. At least 2 meals per week.  No fried foods, no fast food\par \par Water intake per day: 2-3 water bottles per day.\par seltzer - 1L per day.\par coffee 2-3 cups per day in the morning. Drinks it with half and half.  No sugar.  She is open to skim milk. She's open to oat milk. \par \par Physical activity:\par Patient enjoys: crossfit, weight training, walking, swimming\par Current physical activity: swimming since June at least 5 times a week - lap swimming an hour at least, 1 hr walking, 30 min of weight. Alternative thinking about - walking, slight jog intermittently at [employer].  \par \par Habits patient would like to change: portion control, emotional control\par Level of interest in losing weight: 5/5\par Community support: 2/3 fam 3/5 friends\par \par Factors that have helped in the past with losing weight and keeping it off:  incr exercise, eating less take out. \par \par

## 2021-10-04 RX ORDER — PHENTERMINE HYDROCHLORIDE 37.5 MG/1
37.5 TABLET ORAL
Qty: 30 | Refills: 0 | Status: ACTIVE | COMMUNITY
Start: 2021-09-24 | End: 1900-01-01

## 2021-10-18 ENCOUNTER — APPOINTMENT (OUTPATIENT)
Dept: ULTRASOUND IMAGING | Facility: CLINIC | Age: 51
End: 2021-10-18
Payer: COMMERCIAL

## 2021-10-18 PROCEDURE — 76830 TRANSVAGINAL US NON-OB: CPT

## 2021-10-18 PROCEDURE — 76856 US EXAM PELVIC COMPLETE: CPT | Mod: 59

## 2021-10-20 ENCOUNTER — APPOINTMENT (OUTPATIENT)
Dept: BARIATRICS/WEIGHT MGMT | Facility: CLINIC | Age: 51
End: 2021-10-20

## 2022-04-11 PROBLEM — Z11.59 SCREENING FOR VIRAL DISEASE: Status: RESOLVED | Noted: 2020-09-11 | Resolved: 2021-09-03

## 2023-02-16 ENCOUNTER — APPOINTMENT (OUTPATIENT)
Dept: ENDOCRINOLOGY | Facility: CLINIC | Age: 53
End: 2023-02-16
Payer: COMMERCIAL

## 2023-02-16 VITALS
SYSTOLIC BLOOD PRESSURE: 130 MMHG | HEIGHT: 70 IN | BODY MASS INDEX: 41.95 KG/M2 | DIASTOLIC BLOOD PRESSURE: 70 MMHG | HEART RATE: 106 BPM | WEIGHT: 293 LBS | OXYGEN SATURATION: 90 %

## 2023-02-16 DIAGNOSIS — R73.03 PREDIABETES.: ICD-10-CM

## 2023-02-16 PROCEDURE — 99214 OFFICE O/P EST MOD 30 MIN: CPT | Mod: 25

## 2023-02-16 PROCEDURE — 76536 US EXAM OF HEAD AND NECK: CPT

## 2023-02-16 NOTE — HISTORY OF PRESENT ILLNESS
[FreeTextEntry1] : 52 year old female here for evaluation of thyroid nodule. Does not wish to discuss obesity or T2 diabetes.\par \par 2016- was the last ultrasound which showed a right sided 2.0 cm nodule\par 2002: Last FNA of the larger 2 cm nodule\par No family history of thyroid cancer\par No history of head or neck radiation exposure\par \par Never been on thyroid medications\par No difficulty swallowing, no hoarseness and no choking sensation\par No pressure in the neck\par Having some temperature intolerance. Getting periods irregularly. No fatigue. Bowel habits normal.\par \par She does not have recent TFTs \par \par Wants to start intermittent fasting for obesity. Does not wish to address this with us.\par DM2 managed by PCP. Working on lifestyle modifications. Does not wish to address this with us.

## 2023-02-16 NOTE — PHYSICAL EXAM
[Alert] : alert [Well Nourished] : well nourished [No Acute Distress] : no acute distress [EOMI] : extra ocular movement intact [No Proptosis] : no proptosis [No Lid Lag] : no lid lag [Supple] : the neck was supple [No Respiratory Distress] : no respiratory distress [No Accessory Muscle Use] : no accessory muscle use [Normal Rate and Effort] : normal respiratory rate and effort [Normal Rate] : heart rate was normal [Regular Rhythm] : with a regular rhythm [No Edema] : no peripheral edema [Not Tender] : non-tender [Soft] : abdomen soft [No Involuntary Movements] : no involuntary movements were seen [No Joint Swelling] : no joint swelling seen [No Rash] : no rash [Cranial Nerves Intact] : cranial nerves 2-12 were intact [No Motor Deficits] : the motor exam was normal [Oriented x3] : oriented to person, place, and time [Normal Affect] : the affect was normal [Normal Mood] : the mood was normal

## 2023-02-16 NOTE — PROCEDURE
[[employer] e 2008 model, 10-12 MHz frequencies] : multiple real time longitudinal and transverse images were obtained using a high resolution ultrasound with a linear transducer, [employer] e 2008 model, 10-12 MHz frequencies. All measurements will be reported as longitudinal x alexx-posterior x transverse. [Thyroid Nodule] : thyroid nodule [Right Thyroid] : right [Lower] : lower pole there is a  [Solid] : solid [Round] : round in shape [No] : does not have a halo [No calcification] : no calcification [Left Thyroid] : left [Upper] : upper pole there is a  [Mixed] : mixed [Ovoid] : ovoid in shape [Smooth] : smooth [No] : does not have a halo [No calcifications] : no calcifications [] : a heterogeneous parenchyma [Heterogeneous] : heterogenous nodule [FreeTextEntry1] : 3.37 x 1.57 x 2.40 [FreeTextEntry5] : 3.70 x 1.40 x 1.83 [FreeTextEntry2] : 0.83 [FreeTextEntry3] : 0.66 x 0.33 x 0.55

## 2023-02-16 NOTE — IMPRESSION
[FreeTextEntry1] : RLP and LUP nodules stable. [FreeTextEntry2] : Repeat US in 1 year. Obtain TFTs today.

## 2023-02-16 NOTE — PROCEDURE
[[employer] e 2008 model, 10-12 MHz frequencies] : multiple real time longitudinal and transverse images were obtained using a high resolution ultrasound with a linear transducer, [employer] e 2008 model, 10-12 MHz frequencies. All measurements will be reported as longitudinal x alexx-posterior x transverse. [Thyroid Nodule] : thyroid nodule [Right Thyroid] : right [Lower] : lower pole there is a  [Solid] : solid [Round] : round in shape [No] : does not have a halo [No calcification] : no calcification [Left Thyroid] : left [Upper] : upper pole there is a  [Mixed] : mixed [Ovoid] : ovoid in shape [Smooth] : smooth [No] : does not have a halo [No calcifications] : no calcifications [] : a heterogeneous parenchyma [Heterogeneous] : heterogenous nodule [FreeTextEntry1] : 3.37 x 1.57 x 2.40 [FreeTextEntry5] : 3.70 x 1.40 x 1.83 [FreeTextEntry2] : 0.83 [FreeTextEntry3] : 0.66 x 0.33 x 0.55

## 2023-02-16 NOTE — ASSESSMENT
[FreeTextEntry1] : 52 year old female here for evaluation of thyroid nodule. Does not wish to discuss obesity or T2 diabetes.\par \par Thyroid nodule\par ------------------\par -Previous FNA in 2002\par -Nodule displaying interval stability since 2016 \par -Will continue to observe once a year \par \par DM2\par ---------\par -Will continue with healthy diet which is low carb consistent diet \par -Will incorporate exercise as well\par -Wishes to get lab and management with PCP only\par \par Obesity \par ---------\par -Will see her in 3 months  and if unable to lose weight will consider GLP-1 agonist \par -Intermittent fasting was discussed in detail\par \par Follow up in 6 months

## 2023-02-16 NOTE — REASON FOR VISIT
[Follow - Up] : a follow-up visit [Thyroid nodule/ MNG] : thyroid nodule/ MNG [Consultation] : a consultation visit

## 2023-02-17 ENCOUNTER — NON-APPOINTMENT (OUTPATIENT)
Age: 53
End: 2023-02-17

## 2023-02-17 LAB
T3 SERPL-MCNC: 111 NG/DL
T4 FREE SERPL-MCNC: 1.1 NG/DL
TSH SERPL-ACNC: 5.11 UIU/ML

## 2023-05-08 ENCOUNTER — APPOINTMENT (OUTPATIENT)
Dept: ULTRASOUND IMAGING | Facility: CLINIC | Age: 53
End: 2023-05-08
Payer: COMMERCIAL

## 2023-05-08 PROCEDURE — 76856 US EXAM PELVIC COMPLETE: CPT | Mod: 59

## 2023-05-08 PROCEDURE — 76830 TRANSVAGINAL US NON-OB: CPT

## 2023-06-02 ENCOUNTER — APPOINTMENT (OUTPATIENT)
Dept: ENDOCRINOLOGY | Facility: CLINIC | Age: 53
End: 2023-06-02

## 2023-09-12 ENCOUNTER — APPOINTMENT (OUTPATIENT)
Dept: ENDOCRINOLOGY | Facility: CLINIC | Age: 53
End: 2023-09-12

## 2023-10-19 ENCOUNTER — APPOINTMENT (OUTPATIENT)
Dept: ENDOCRINOLOGY | Facility: CLINIC | Age: 53
End: 2023-10-19
Payer: COMMERCIAL

## 2023-10-19 VITALS
SYSTOLIC BLOOD PRESSURE: 122 MMHG | OXYGEN SATURATION: 94 % | DIASTOLIC BLOOD PRESSURE: 68 MMHG | HEIGHT: 70 IN | HEART RATE: 100 BPM

## 2023-10-19 DIAGNOSIS — E04.1 NONTOXIC SINGLE THYROID NODULE: ICD-10-CM

## 2023-10-19 DIAGNOSIS — E66.01 MORBID (SEVERE) OBESITY DUE TO EXCESS CALORIES: ICD-10-CM

## 2023-10-19 PROCEDURE — 99214 OFFICE O/P EST MOD 30 MIN: CPT

## 2023-10-19 RX ORDER — SEMAGLUTIDE 0.68 MG/ML
2 INJECTION, SOLUTION SUBCUTANEOUS
Qty: 2 | Refills: 5 | Status: ACTIVE | COMMUNITY
Start: 2023-10-19 | End: 1900-01-01

## 2023-10-20 RX ORDER — SEMAGLUTIDE 0.68 MG/ML
2 INJECTION, SOLUTION SUBCUTANEOUS
Qty: 2 | Refills: 5 | Status: ACTIVE | COMMUNITY
Start: 2023-10-19 | End: 1900-01-01

## 2023-11-17 LAB
ALBUMIN SERPL ELPH-MCNC: 4.3 G/DL
ALP BLD-CCNC: 74 U/L
ALT SERPL-CCNC: 31 U/L
ANION GAP SERPL CALC-SCNC: 11 MMOL/L
AST SERPL-CCNC: 23 U/L
BILIRUB SERPL-MCNC: 0.4 MG/DL
BUN SERPL-MCNC: 12 MG/DL
CALCIUM SERPL-MCNC: 9.5 MG/DL
CHLORIDE SERPL-SCNC: 101 MMOL/L
CHOLEST SERPL-MCNC: 158 MG/DL
CO2 SERPL-SCNC: 28 MMOL/L
CREAT SERPL-MCNC: 0.64 MG/DL
EGFR: 105 ML/MIN/1.73M2
GLUCOSE SERPL-MCNC: 119 MG/DL
HDLC SERPL-MCNC: 35 MG/DL
LDLC SERPL CALC-MCNC: 107 MG/DL
NONHDLC SERPL-MCNC: 123 MG/DL
POTASSIUM SERPL-SCNC: 4.8 MMOL/L
PROT SERPL-MCNC: 7.3 G/DL
SODIUM SERPL-SCNC: 140 MMOL/L
TRIGL SERPL-MCNC: 88 MG/DL

## 2023-11-21 LAB
CREAT SPEC-SCNC: 99 MG/DL
ESTIMATED AVERAGE GLUCOSE: 163 MG/DL
HBA1C MFR BLD HPLC: 7.3 %
MICROALBUMIN 24H UR DL<=1MG/L-MCNC: 1.2 MG/DL
MICROALBUMIN/CREAT 24H UR-RTO: 12 MG/G

## 2024-03-25 ENCOUNTER — APPOINTMENT (OUTPATIENT)
Dept: ENDOCRINOLOGY | Facility: CLINIC | Age: 54
End: 2024-03-25

## 2024-04-15 NOTE — ASSESSMENT
[FreeTextEntry1] : 1- Hemorrhoid\par I discussed the risks benefits and alternatives of Hemorrhoid Electrical Treatment at length. we discussed the procedure and the recovery period. We also discussed that periodically additional / surgical hemorrhoid treatment may become necessary.\par \par Patient ordered for HET therapy. \par \par 2- Morbid Obesity\par Nutritionist referral placed. Education provided to the patient on weight loss and\par diet. \par \par 3- Screening colonoscopy \par Ordered colonoscopy procedure discussed at length. All questions answered \par and reviewed. Patient verbalized understanding of all information provided. 
HOME

## 2024-05-03 ENCOUNTER — APPOINTMENT (OUTPATIENT)
Dept: ULTRASOUND IMAGING | Facility: CLINIC | Age: 54
End: 2024-05-03
Payer: COMMERCIAL

## 2024-05-03 PROCEDURE — 76830 TRANSVAGINAL US NON-OB: CPT

## 2024-05-03 PROCEDURE — 76856 US EXAM PELVIC COMPLETE: CPT | Mod: 59

## 2024-07-03 ENCOUNTER — APPOINTMENT (OUTPATIENT)
Dept: ULTRASOUND IMAGING | Facility: CLINIC | Age: 54
End: 2024-07-03
Payer: COMMERCIAL

## 2024-07-03 PROCEDURE — 76856 US EXAM PELVIC COMPLETE: CPT | Mod: 59

## 2024-07-03 PROCEDURE — 76830 TRANSVAGINAL US NON-OB: CPT

## 2024-07-11 ENCOUNTER — APPOINTMENT (OUTPATIENT)
Dept: ENDOCRINOLOGY | Facility: CLINIC | Age: 54
End: 2024-07-11
Payer: COMMERCIAL

## 2024-07-11 VITALS
WEIGHT: 293 LBS | BODY MASS INDEX: 41.95 KG/M2 | HEART RATE: 74 BPM | HEIGHT: 70 IN | OXYGEN SATURATION: 98 % | DIASTOLIC BLOOD PRESSURE: 62 MMHG | SYSTOLIC BLOOD PRESSURE: 100 MMHG

## 2024-07-11 DIAGNOSIS — E04.1 NONTOXIC SINGLE THYROID NODULE: ICD-10-CM

## 2024-07-11 DIAGNOSIS — E66.01 MORBID (SEVERE) OBESITY DUE TO EXCESS CALORIES: ICD-10-CM

## 2024-07-11 DIAGNOSIS — Z86.39 PERSONAL HISTORY OF OTHER ENDOCRINE, NUTRITIONAL AND METABOLIC DISEASE: ICD-10-CM

## 2024-07-11 DIAGNOSIS — E03.8 OTHER SPECIFIED HYPOTHYROIDISM: ICD-10-CM

## 2024-07-11 PROCEDURE — G2211 COMPLEX E/M VISIT ADD ON: CPT | Mod: NC,1L

## 2024-07-11 PROCEDURE — 99214 OFFICE O/P EST MOD 30 MIN: CPT

## 2024-07-22 ENCOUNTER — APPOINTMENT (OUTPATIENT)
Dept: ULTRASOUND IMAGING | Facility: CLINIC | Age: 54
End: 2024-07-22
Payer: COMMERCIAL

## 2024-07-22 PROCEDURE — 76536 US EXAM OF HEAD AND NECK: CPT

## 2024-08-15 ENCOUNTER — APPOINTMENT (OUTPATIENT)
Dept: MRI IMAGING | Facility: CLINIC | Age: 54
End: 2024-08-15

## 2024-08-15 PROCEDURE — 72148 MRI LUMBAR SPINE W/O DYE: CPT | Mod: 26

## 2024-08-19 ENCOUNTER — OUTPATIENT (OUTPATIENT)
Dept: OUTPATIENT SERVICES | Facility: HOSPITAL | Age: 54
LOS: 1 days | End: 2024-08-19
Payer: COMMERCIAL

## 2024-08-19 VITALS
TEMPERATURE: 98 F | DIASTOLIC BLOOD PRESSURE: 69 MMHG | HEIGHT: 70 IN | SYSTOLIC BLOOD PRESSURE: 99 MMHG | HEART RATE: 54 BPM | RESPIRATION RATE: 16 BRPM | OXYGEN SATURATION: 99 % | WEIGHT: 293 LBS

## 2024-08-19 DIAGNOSIS — Z90.3 ACQUIRED ABSENCE OF STOMACH [PART OF]: Chronic | ICD-10-CM

## 2024-08-19 DIAGNOSIS — Z90.89 ACQUIRED ABSENCE OF OTHER ORGANS: Chronic | ICD-10-CM

## 2024-08-19 DIAGNOSIS — N84.0 POLYP OF CORPUS UTERI: ICD-10-CM

## 2024-08-19 DIAGNOSIS — Z01.818 ENCOUNTER FOR OTHER PREPROCEDURAL EXAMINATION: ICD-10-CM

## 2024-08-19 DIAGNOSIS — G47.33 OBSTRUCTIVE SLEEP APNEA (ADULT) (PEDIATRIC): ICD-10-CM

## 2024-08-19 LAB
ANION GAP SERPL CALC-SCNC: 12 MMOL/L — SIGNIFICANT CHANGE UP (ref 5–17)
BUN SERPL-MCNC: 12 MG/DL — SIGNIFICANT CHANGE UP (ref 7–23)
CALCIUM SERPL-MCNC: 9.8 MG/DL — SIGNIFICANT CHANGE UP (ref 8.4–10.5)
CHLORIDE SERPL-SCNC: 106 MMOL/L — SIGNIFICANT CHANGE UP (ref 96–108)
CO2 SERPL-SCNC: 23 MMOL/L — SIGNIFICANT CHANGE UP (ref 22–31)
CREAT SERPL-MCNC: 0.66 MG/DL — SIGNIFICANT CHANGE UP (ref 0.5–1.3)
EGFR: 104 ML/MIN/1.73M2 — SIGNIFICANT CHANGE UP
GLUCOSE SERPL-MCNC: 91 MG/DL — SIGNIFICANT CHANGE UP (ref 70–99)
HCT VFR BLD CALC: 40.7 % — SIGNIFICANT CHANGE UP (ref 34.5–45)
HGB BLD-MCNC: 13.4 G/DL — SIGNIFICANT CHANGE UP (ref 11.5–15.5)
MCHC RBC-ENTMCNC: 28.9 PG — SIGNIFICANT CHANGE UP (ref 27–34)
MCHC RBC-ENTMCNC: 32.9 GM/DL — SIGNIFICANT CHANGE UP (ref 32–36)
MCV RBC AUTO: 87.7 FL — SIGNIFICANT CHANGE UP (ref 80–100)
NRBC # BLD: 0 /100 WBCS — SIGNIFICANT CHANGE UP (ref 0–0)
PLATELET # BLD AUTO: 274 K/UL — SIGNIFICANT CHANGE UP (ref 150–400)
POTASSIUM SERPL-MCNC: 3.8 MMOL/L — SIGNIFICANT CHANGE UP (ref 3.5–5.3)
POTASSIUM SERPL-SCNC: 3.8 MMOL/L — SIGNIFICANT CHANGE UP (ref 3.5–5.3)
RBC # BLD: 4.64 M/UL — SIGNIFICANT CHANGE UP (ref 3.8–5.2)
RBC # FLD: 13.3 % — SIGNIFICANT CHANGE UP (ref 10.3–14.5)
SODIUM SERPL-SCNC: 141 MMOL/L — SIGNIFICANT CHANGE UP (ref 135–145)
WBC # BLD: 7.92 K/UL — SIGNIFICANT CHANGE UP (ref 3.8–10.5)
WBC # FLD AUTO: 7.92 K/UL — SIGNIFICANT CHANGE UP (ref 3.8–10.5)

## 2024-08-19 PROCEDURE — 80048 BASIC METABOLIC PNL TOTAL CA: CPT

## 2024-08-19 PROCEDURE — 85027 COMPLETE CBC AUTOMATED: CPT

## 2024-08-19 PROCEDURE — 36415 COLL VENOUS BLD VENIPUNCTURE: CPT

## 2024-08-19 PROCEDURE — G0463: CPT

## 2024-08-19 NOTE — H&P PST ADULT - PROBLEM SELECTOR PLAN 1
planned for D&C, Operative hysteroscopy, resection of polyp Aveta on 9/9/2024   [facility] labs send  preprocedure instructions discussed planned for D&C, Operative hysteroscopy, resection of polyp Aveta on 9/9/2024   [facility] labs send  preprocedure instructions discussed  [facility] the DOs planned for D&C, Operative hysteroscopy, resection of polyp Aveta on 9/9/2024   [facility] labs send  preprocedure instructions discussed  [facility] the DOS

## 2024-08-19 NOTE — H&P PST ADULT - NSANTHOSAYNRD_GEN_A_CORE
No. ARLET screening performed.  STOP BANG Legend: 0-2 = LOW Risk; 3-4 = INTERMEDIATE Risk; 5-8 = HIGH Risk Yes

## 2024-08-19 NOTE — H&P PST ADULT - TEMPERATURE IN FAHRENHEIT (DEGREES F)
ED Nurse Note:

Pt walked in with mom c/o cough, congestion, sore throat, abd pain since 1/28. 
Pt stated she took OTC meds but not effective. temp 99.1f oral. ao4. nad. vss 98

## 2024-08-19 NOTE — H&P PST ADULT - OTHER CARE PROVIDERS
dr. Stefany stevens last visit 7/2024 dr. Stefany stevens last visit 7/2024, [facility] Guo  ENT./Dr. Bermudez  [phone]

## 2024-08-19 NOTE — H&P PST ADULT - NSICDXPASTSURGICALHX_GEN_ALL_CORE_FT
PAST SURGICAL HISTORY:  H/O gastric sleeve      PAST SURGICAL HISTORY:  H/O gastric sleeve     S/P tonsillectomy

## 2024-08-19 NOTE — H&P PST ADULT - ASSESSMENT
DASI score:  DASIactivity:  loose teeth or dentures: denies   airway   DASI score: 6.8   DASIactivity: walk alot/stairs/ swimming  without cp/SOB  loose teeth or dentures: denies   airway Mp3   DASI score: 6.8   DASIactivity: walk alot/stairs/ swimming/ working with    without cp/SOB  loose teeth or dentures: denies   airway Mp3

## 2024-08-19 NOTE — H&P PST ADULT - NSICDXPASTMEDICALHX_GEN_ALL_CORE_FT
PAST MEDICAL HISTORY:  DM2 (diabetes mellitus, type 2)     H/O thyroid nodule     Mild anemia     Morbid obesity     Tendonitis     Uterine polyp      PAST MEDICAL HISTORY:  2019 novel coronavirus disease (COVID-19)     DM2 (diabetes mellitus, type 2)     H/O thyroid nodule     Mild anemia     Morbid obesity     ARLET treated with BiPAP     Tendonitis     Uterine polyp      PAST MEDICAL HISTORY:  2019 novel coronavirus disease (COVID-19)     H/O thyroid nodule     History of prediabetes     Mild anemia     Morbid obesity     ARLET treated with BiPAP     Tendonitis     Uterine polyp

## 2024-08-19 NOTE — H&P PST ADULT - HISTORY OF PRESENT ILLNESS
53 year old Morbid obese ( BMI 45.2) female  with uterine polyp planned for D&C, Operative hysteroscopy, resection of polyp Aveta on 2024  53 year old Morbid obese ( BMI 45.2) female  with PMH of Dm2 ( resolved after bariatric surgery 2023, last A1c 5.5 on 2024),  uterine polyp ( LMP 2024)  planned for D&C, Operative hysteroscopy, resection of polyp Aveta on 2024  53 year old Morbid obese ( BMI 45.2) female  with PMH of Dm2 ( resolved after bariatric surgery 2023, last A1c 5.5 on 2024), ARLET w/Bipap use @ HS, with uterine polyp ( LMP 2024)  planned for D&C, Operative hysteroscopy, resection of polyp Aveta on 2024

## 2024-08-19 NOTE — H&P PST ADULT - NSANTHGENDERRD_ENT_A_CORE
No
Protocol: Photochemotherapy: Baby Oil and NBUVB
Detail Level: Zone
Frequency: TIW
Dose: to be determined by the phototherapy office
Consent: Written consent obtained.  The risks were reviewed with the patient including but not limited to: burn, pigmentary changes, pain, blistering, scabbing, redness, increased risk of skin cancers, and the remote possibility of scarring.

## 2024-08-19 NOTE — H&P PST ADULT - ATTENDING COMMENTS
53 yo hx morbid obesity with thickened endometrium 7mm with cystic changes  cant rule out polyp here for  op hyst  possible removal polyp with avita device -being done in or for bmi--hx gastric sleeve

## 2024-09-09 ENCOUNTER — OUTPATIENT (OUTPATIENT)
Dept: OUTPATIENT SERVICES | Facility: HOSPITAL | Age: 54
LOS: 1 days | End: 2024-09-09
Payer: COMMERCIAL

## 2024-09-09 ENCOUNTER — TRANSCRIPTION ENCOUNTER (OUTPATIENT)
Age: 54
End: 2024-09-09

## 2024-09-09 VITALS
HEART RATE: 55 BPM | TEMPERATURE: 98 F | OXYGEN SATURATION: 96 % | RESPIRATION RATE: 16 BRPM | SYSTOLIC BLOOD PRESSURE: 111 MMHG | WEIGHT: 293 LBS | HEIGHT: 70 IN | DIASTOLIC BLOOD PRESSURE: 74 MMHG

## 2024-09-09 VITALS — OXYGEN SATURATION: 97 % | RESPIRATION RATE: 20 BRPM | HEART RATE: 62 BPM

## 2024-09-09 DIAGNOSIS — N84.0 POLYP OF CORPUS UTERI: ICD-10-CM

## 2024-09-09 DIAGNOSIS — Z90.3 ACQUIRED ABSENCE OF STOMACH [PART OF]: Chronic | ICD-10-CM

## 2024-09-09 DIAGNOSIS — Z90.89 ACQUIRED ABSENCE OF OTHER ORGANS: Chronic | ICD-10-CM

## 2024-09-09 LAB — HCG UR QL: NEGATIVE — SIGNIFICANT CHANGE UP

## 2024-09-09 PROCEDURE — C9399: CPT

## 2024-09-09 PROCEDURE — 88305 TISSUE EXAM BY PATHOLOGIST: CPT

## 2024-09-09 PROCEDURE — C1782: CPT

## 2024-09-09 PROCEDURE — 58558 HYSTEROSCOPY BIOPSY: CPT

## 2024-09-09 PROCEDURE — 81025 URINE PREGNANCY TEST: CPT

## 2024-09-09 PROCEDURE — 88305 TISSUE EXAM BY PATHOLOGIST: CPT | Mod: 26

## 2024-09-09 DEVICE — AVETA SMOL RESECTING DEVICE 2.9MM: Type: IMPLANTABLE DEVICE | Status: FUNCTIONAL

## 2024-09-09 RX ORDER — ACETAMINOPHEN 325 MG/1
3 TABLET ORAL
Qty: 0 | Refills: 0 | DISCHARGE

## 2024-09-09 RX ORDER — FENTANYL CITRATE 50 UG/ML
50 INJECTION INTRAMUSCULAR; INTRAVENOUS
Refills: 0 | Status: DISCONTINUED | OUTPATIENT
Start: 2024-09-09 | End: 2024-09-09

## 2024-09-09 RX ORDER — OXYCODONE HYDROCHLORIDE 5 MG/1
5 TABLET ORAL ONCE
Refills: 0 | Status: DISCONTINUED | OUTPATIENT
Start: 2024-09-09 | End: 2024-09-09

## 2024-09-09 RX ORDER — AZELASTINE HYDROCHLORIDE 137 UG/1
1 SPRAY, METERED NASAL
Refills: 0 | DISCHARGE

## 2024-09-09 RX ORDER — LIDOCAINE HCL 20 MG/ML
0.2 VIAL (ML) INJECTION ONCE
Refills: 0 | Status: DISCONTINUED | OUTPATIENT
Start: 2024-09-09 | End: 2024-09-09

## 2024-09-09 RX ORDER — ONDANSETRON 2 MG/ML
4 INJECTION, SOLUTION INTRAMUSCULAR; INTRAVENOUS ONCE
Refills: 0 | Status: DISCONTINUED | OUTPATIENT
Start: 2024-09-09 | End: 2024-09-09

## 2024-09-09 RX ORDER — SODIUM CHLORIDE 9 MG/ML
3 INJECTION INTRAMUSCULAR; INTRAVENOUS; SUBCUTANEOUS EVERY 8 HOURS
Refills: 0 | Status: DISCONTINUED | OUTPATIENT
Start: 2024-09-09 | End: 2024-09-09

## 2024-09-09 RX ORDER — IBUPROFEN 600 MG
1 TABLET ORAL
Qty: 0 | Refills: 0 | DISCHARGE

## 2024-09-09 RX ORDER — ACETAMINOPHEN 325 MG/1
1000 TABLET ORAL ONCE
Refills: 0 | Status: DISCONTINUED | OUTPATIENT
Start: 2024-09-09 | End: 2024-09-09

## 2024-09-09 RX ORDER — OXYCODONE HYDROCHLORIDE 5 MG/1
10 TABLET ORAL ONCE
Refills: 0 | Status: DISCONTINUED | OUTPATIENT
Start: 2024-09-09 | End: 2024-09-09

## 2024-09-09 NOTE — ASU DISCHARGE PLAN (ADULT/PEDIATRIC) - ASU DC SPECIAL INSTRUCTIONSFT
Expect abdominal cramping/pain and spotting for the next weeks. Take ibuprofen and Tylenol for cramping. Use a pad as needed. Call your physician or go to the emergency room if you experience any of the following: heavy vaginal bleeding (soaking more than 2 pads in 1 hour for 2 hours), fever, chills, nausea, vomiting, or pain that is not controlled by medication. Follow-up with Dr. Dr. Marx in 2 weeks.

## 2024-09-09 NOTE — BRIEF OPERATIVE NOTE - NSICDXBRIEFPREOP_GEN_ALL_CORE_FT
PRE-OP DIAGNOSIS:  Endometrial thickening on ultrasound 09-Sep-2024 10:36:51  Jenelle Fierro  Morbid obesity 09-Sep-2024 10:36:58  Jenelle Fierro

## 2024-09-09 NOTE — BRIEF OPERATIVE NOTE - OPERATION/FINDINGS
EUA, 6wk size anteverted uterus, cervix wnl  On HSC, isolated posterior wall polypoid tissue visualized   bilateral ostia visualised   rest of atrophic endometrium

## 2024-09-09 NOTE — ASU DISCHARGE PLAN (ADULT/PEDIATRIC) - CARE PROVIDER_API CALL
Allyn Marx  Obstetrics and Gynecology  [address]  [place] [address]  Phone: [phone]  Fax: [phone]  Follow Up Time: 2 weeks

## 2024-09-09 NOTE — BRIEF OPERATIVE NOTE - NSICDXBRIEFPOSTOP_GEN_ALL_CORE_FT
POST-OP DIAGNOSIS:  Morbid obesity 09-Sep-2024 10:37:08  Jenelle Fierro  Endometrial polyp 09-Sep-2024 10:37:28  Jenelle Fierro

## 2024-09-09 NOTE — BRIEF OPERATIVE NOTE - NSICDXBRIEFPROCEDURE_GEN_ALL_CORE_FT
PROCEDURES:  Exam under anesthesia, pelvic 09-Sep-2024 10:36:17  Jenelle Fierro  Polypectomy, hysteroscopic, uterus, with dilation and curettage 09-Sep-2024 10:36:40  Jenelle Fierro

## 2024-09-11 LAB — SURGICAL PATHOLOGY STUDY: SIGNIFICANT CHANGE UP

## 2025-02-05 ENCOUNTER — APPOINTMENT (OUTPATIENT)
Dept: ENDOCRINOLOGY | Facility: CLINIC | Age: 55
End: 2025-02-05

## 2025-02-05 VITALS
HEIGHT: 70 IN | BODY MASS INDEX: 40.8 KG/M2 | DIASTOLIC BLOOD PRESSURE: 78 MMHG | SYSTOLIC BLOOD PRESSURE: 124 MMHG | OXYGEN SATURATION: 98 % | WEIGHT: 285 LBS | HEART RATE: 51 BPM

## 2025-02-05 DIAGNOSIS — E04.1 NONTOXIC SINGLE THYROID NODULE: ICD-10-CM

## 2025-02-05 DIAGNOSIS — Z00.00 ENCOUNTER FOR GENERAL ADULT MEDICAL EXAMINATION W/OUT ABNORMAL FINDINGS: ICD-10-CM

## 2025-02-05 LAB
ESTRADIOL SERPL-MCNC: 17 PG/ML
FSH SERPL-MCNC: 43.6 IU/L
LH SERPL-ACNC: 33.5 IU/L

## 2025-02-05 PROCEDURE — 99214 OFFICE O/P EST MOD 30 MIN: CPT

## 2025-02-05 PROCEDURE — G2211 COMPLEX E/M VISIT ADD ON: CPT | Mod: NC

## 2025-02-06 ENCOUNTER — APPOINTMENT (OUTPATIENT)
Dept: ENDOCRINOLOGY | Facility: CLINIC | Age: 55
End: 2025-02-06

## 2025-02-12 ENCOUNTER — TRANSCRIPTION ENCOUNTER (OUTPATIENT)
Age: 55
End: 2025-02-12

## 2025-04-17 ENCOUNTER — APPOINTMENT (OUTPATIENT)
Dept: ULTRASOUND IMAGING | Facility: CLINIC | Age: 55
End: 2025-04-17
Payer: COMMERCIAL

## 2025-04-17 PROCEDURE — 76856 US EXAM PELVIC COMPLETE: CPT | Mod: 59

## 2025-04-17 PROCEDURE — 76830 TRANSVAGINAL US NON-OB: CPT

## 2025-06-19 ENCOUNTER — APPOINTMENT (OUTPATIENT)
Dept: ULTRASOUND IMAGING | Facility: CLINIC | Age: 55
End: 2025-06-19

## 2025-06-19 PROCEDURE — 76536 US EXAM OF HEAD AND NECK: CPT

## 2025-07-28 ENCOUNTER — NON-APPOINTMENT (OUTPATIENT)
Age: 55
End: 2025-07-28

## 2025-07-30 ENCOUNTER — APPOINTMENT (OUTPATIENT)
Dept: ENDOCRINOLOGY | Facility: CLINIC | Age: 55
End: 2025-07-30
Payer: COMMERCIAL

## 2025-07-30 VITALS
DIASTOLIC BLOOD PRESSURE: 70 MMHG | HEART RATE: 58 BPM | BODY MASS INDEX: 40.52 KG/M2 | OXYGEN SATURATION: 98 % | SYSTOLIC BLOOD PRESSURE: 118 MMHG | HEIGHT: 70 IN | WEIGHT: 283 LBS

## 2025-07-30 DIAGNOSIS — E66.01 MORBID (SEVERE) OBESITY DUE TO EXCESS CALORIES: ICD-10-CM

## 2025-07-30 DIAGNOSIS — E11.9 TYPE 2 DIABETES MELLITUS W/OUT COMPLICATIONS: ICD-10-CM

## 2025-07-30 DIAGNOSIS — Z00.00 ENCOUNTER FOR GENERAL ADULT MEDICAL EXAMINATION W/OUT ABNORMAL FINDINGS: ICD-10-CM

## 2025-07-30 DIAGNOSIS — E03.8 OTHER SPECIFIED HYPOTHYROIDISM: ICD-10-CM

## 2025-07-30 PROCEDURE — 99214 OFFICE O/P EST MOD 30 MIN: CPT

## 2025-07-30 PROCEDURE — G2211 COMPLEX E/M VISIT ADD ON: CPT | Mod: NC

## 2025-07-30 RX ORDER — SEMAGLUTIDE 0.68 MG/ML
2 INJECTION, SOLUTION SUBCUTANEOUS
Qty: 1 | Refills: 1 | Status: ACTIVE | COMMUNITY
Start: 2025-07-30 | End: 1900-01-01

## (undated) DEVICE — ELCTR CUTTING LOOP 24FR RIGHT ANGLE

## (undated) DEVICE — DRSG TELFA 3 X 8

## (undated) DEVICE — PACK BASIC GOWN

## (undated) DEVICE — SOL IRR GLYCINE 1.5% 3000L

## (undated) DEVICE — DRAPE MAYO STAND 30"

## (undated) DEVICE — SYR ASEPTO

## (undated) DEVICE — FOLEY TRAY 16FR 5CC LTX UMETER CLOSED

## (undated) DEVICE — LAP PAD 18 X 18"

## (undated) DEVICE — PREP BETADINE KIT

## (undated) DEVICE — DRAPE LAPAROSCOPIC FLUID CONTROL PACK

## (undated) DEVICE — GOWN TRIMAX LG

## (undated) DEVICE — AVETA CORAL HYSTEROSCOPE 4.6MM DISP

## (undated) DEVICE — POSITIONER FOAM EGG CRATE ULNAR 2PCS (PINK)

## (undated) DEVICE — DRAPE LIGHT HANDLE COVER (BLUE)

## (undated) DEVICE — SOL IRR POUR NS 0.9% 500ML

## (undated) DEVICE — VENODYNE/SCD SLEEVE CALF MEDIUM

## (undated) DEVICE — TUBING STRYKER HYSTEROSCOPY INFLOW OUTFLOW

## (undated) DEVICE — SPECIMEN CONTAINER 100ML

## (undated) DEVICE — TUBING SUCTION 20FT

## (undated) DEVICE — GLV 6.5 PROTEXIS (WHITE)

## (undated) DEVICE — FOLEY TRAY 16FR LF URINE METER SURESTEP

## (undated) DEVICE — AVETA FLUID MANAGEMENT ACCESSORY

## (undated) DEVICE — ACCESSORY AVETA WASTE MANAGEMENT 3MM

## (undated) DEVICE — WARMING BLANKET UPPER ADULT

## (undated) DEVICE — SOL IRR POUR H2O 250ML

## (undated) DEVICE — VISITEC 4X4

## (undated) DEVICE — DRAPE TOWEL BLUE 17" X 24"

## (undated) DEVICE — MEDICATION LABELS W MARKER

## (undated) DEVICE — ELCTR CUTTING LOOP 24FR 12 DEG 0.35 WIRE